# Patient Record
Sex: FEMALE | Race: WHITE
[De-identification: names, ages, dates, MRNs, and addresses within clinical notes are randomized per-mention and may not be internally consistent; named-entity substitution may affect disease eponyms.]

---

## 2021-10-08 ENCOUNTER — HOSPITAL ENCOUNTER (EMERGENCY)
Dept: HOSPITAL 76 - ED | Age: 31
LOS: 1 days | Discharge: HOME | End: 2021-10-09
Payer: MEDICAID

## 2021-10-08 DIAGNOSIS — D72.829: ICD-10-CM

## 2021-10-08 DIAGNOSIS — N83.201: Primary | ICD-10-CM

## 2021-10-08 PROCEDURE — 87086 URINE CULTURE/COLONY COUNT: CPT

## 2021-10-08 PROCEDURE — 96375 TX/PRO/DX INJ NEW DRUG ADDON: CPT

## 2021-10-08 PROCEDURE — 74177 CT ABD & PELVIS W/CONTRAST: CPT

## 2021-10-08 PROCEDURE — 99284 EMERGENCY DEPT VISIT MOD MDM: CPT

## 2021-10-08 PROCEDURE — 80053 COMPREHEN METABOLIC PANEL: CPT

## 2021-10-08 PROCEDURE — 96374 THER/PROPH/DIAG INJ IV PUSH: CPT

## 2021-10-08 PROCEDURE — 36415 COLL VENOUS BLD VENIPUNCTURE: CPT

## 2021-10-08 PROCEDURE — 99283 EMERGENCY DEPT VISIT LOW MDM: CPT

## 2021-10-08 PROCEDURE — 85025 COMPLETE CBC W/AUTO DIFF WBC: CPT

## 2021-10-08 PROCEDURE — 83690 ASSAY OF LIPASE: CPT

## 2021-10-08 PROCEDURE — 81003 URINALYSIS AUTO W/O SCOPE: CPT

## 2021-10-08 PROCEDURE — 84702 CHORIONIC GONADOTROPIN TEST: CPT

## 2021-10-08 PROCEDURE — 81001 URINALYSIS AUTO W/SCOPE: CPT

## 2021-10-08 NOTE — ED PHYSICIAN DOCUMENTATION
PD HPI ABD PAIN





- Stated complaint


Stated Complaint: LOW R SIDE PX





- Chief complaint


Chief Complaint: Abd Pain





- History obtained from


History obtained from: Patient





- History of Present Illness


Timing - onset: How many days ago ("few days" per patient)


Timing - details: Gradual onset, Waxing and waning


Pain level now: 5


Quality: Pain


Location: RLQ


Radiation: Lower back, Other (right hip)


Improved by: Laying still


Worsened by: Moving, Palpation


Associated symptoms: No: Fever, Nausea, Vomiting, Diarrhea, Constipation


Similar symptoms before: Has not had sx before


Recently seen: Not recently seen





Review of Systems


Constitutional: denies: Fever, Chills, Sweats


Cardiac: reports: Reviewed and negative


Respiratory: reports: Reviewed and negative


GI: reports: Abdominal Pain.  denies: Abdominal Swelling, Nausea, Vomiting, 

Constipation, Diarrhea


: denies: Dysuria, Frequency, Vaginal bleeding, Now pregnant EGA


Musculoskeletal: reports: Back pain





PD PAST MEDICAL HISTORY





- Past Medical History


Past Medical History: Yes


GYN: Endometriosis





- Past Surgical History


Past Surgical History: Yes


General: Cholecystectomy, Appendectomy





- Present Medications


Home Medications: 


                                Ambulatory Orders











 Medication  Instructions  Recorded  Confirmed


 


HYDROcod/ACETAM 5/325 [Norco 5/325] 1 - 2 tablet PO Q6H PRN #14 tablet 10/09/21 














- Allergies


Allergies/Adverse Reactions: 


                                    Allergies











Allergy/AdvReac Type Severity Reaction Status Date / Time


 


No Known Drug Allergies Allergy   Verified 10/08/21 23:48














- Living Situation


Living Arrangement: reports: At home





PD ED PE NORMAL





- Vitals


Vital signs reviewed: Yes





- General


General: Alert and oriented X 3, No acute distress, Well developed/nourished





- Cardiac


Cardiac: RRR, No murmur





- Respiratory


Respiratory: No respiratory distress, Clear bilaterally





- Abdomen


Abdomen: Soft, Non distended, Other (mild TTP right hemipelvis without guarding 

or rebound, no specific point tenderness)





- Back


Back: No CVA TTP





- Derm


Derm: Normal color, Warm and dry, No rash





Results





- Vitals


Vitals: 


                               Vital Signs - 24 hr











  10/08/21 10/09/21 10/09/21





  23:41 01:48 03:30


 


Temperature 36.7 C  37.3 C


 


Heart Rate 135 H  92


 


Respiratory 16 22 20





Rate   


 


Blood Pressure 189/105 H  124/91 H


 


O2 Saturation 98  














  10/09/21





  03:31


 


Temperature 37.3 C


 


Heart Rate 92


 


Respiratory 20





Rate 


 


Blood Pressure 124/91 H


 


O2 Saturation 99








                                     Oxygen











O2 Source                      Room air

















- Labs


Labs: 


                                Laboratory Tests











  10/09/21 10/09/21 10/09/21





  00:12 00:12 00:12


 


WBC  13.5 H  


 


RBC  4.60  


 


Hgb  11.5 L  


 


Hct  37.6  


 


MCV  81.7  


 


MCH  25.0 L  


 


MCHC  30.6 L  


 


RDW  15.1 H  


 


Plt Count  288  


 


MPV  10.0  


 


Neut # (Auto)  9.5 H  


 


Lymph # (Auto)  2.7  


 


Mono # (Auto)  0.9  


 


Eos # (Auto)  0.2  


 


Baso # (Auto)  0.1  


 


Absolute Nucleated RBC  0.00  


 


Nucleated RBC %  0.0  


 


Sodium   136 


 


Potassium   3.9 


 


Chloride   101 


 


Carbon Dioxide   23 


 


Anion Gap   12.0 


 


BUN   12 


 


Creatinine   0.6 


 


Estimated GFR (MDRD)   116 


 


Glucose   124 H 


 


Calcium   8.9 


 


Total Bilirubin   0.6 


 


AST   20 


 


ALT   18 


 


Alkaline Phosphatase   58 


 


Total Protein   7.6 


 


Albumin   4.0 


 


Globulin   3.6 


 


Albumin/Globulin Ratio   1.1 


 


Lipase   27 


 


HCG, Quant    < 0.60


 


Urine Color   


 


Urine Clarity   


 


Urine pH   


 


Ur Specific Gravity   


 


Urine Protein   


 


Urine Glucose (UA)   


 


Urine Ketones   


 


Urine Occult Blood   


 


Urine Nitrite   


 


Urine Bilirubin   


 


Urine Urobilinogen   


 


Ur Leukocyte Esterase   


 


Urine RBC   


 


Urine WBC   


 


Ur Squamous Epith Cells   


 


Urine Bacteria   


 


Ur Microscopic Review   


 


Urine Culture Comments   














  10/09/21





  01:25


 


WBC 


 


RBC 


 


Hgb 


 


Hct 


 


MCV 


 


MCH 


 


MCHC 


 


RDW 


 


Plt Count 


 


MPV 


 


Neut # (Auto) 


 


Lymph # (Auto) 


 


Mono # (Auto) 


 


Eos # (Auto) 


 


Baso # (Auto) 


 


Absolute Nucleated RBC 


 


Nucleated RBC % 


 


Sodium 


 


Potassium 


 


Chloride 


 


Carbon Dioxide 


 


Anion Gap 


 


BUN 


 


Creatinine 


 


Estimated GFR (MDRD) 


 


Glucose 


 


Calcium 


 


Total Bilirubin 


 


AST 


 


ALT 


 


Alkaline Phosphatase 


 


Total Protein 


 


Albumin 


 


Globulin 


 


Albumin/Globulin Ratio 


 


Lipase 


 


HCG, Quant 


 


Urine Color  YELLOW


 


Urine Clarity  CLEAR


 


Urine pH  6.0


 


Ur Specific Gravity  1.020


 


Urine Protein  NEGATIVE


 


Urine Glucose (UA)  NEGATIVE


 


Urine Ketones  NEGATIVE


 


Urine Occult Blood  TRACE-INTA


 


Urine Nitrite  NEGATIVE


 


Urine Bilirubin  NEGATIVE


 


Urine Urobilinogen  0.2 (NORMAL)


 


Ur Leukocyte Esterase  SMALL H


 


Urine RBC  0-5


 


Urine WBC  4-5


 


Ur Squamous Epith Cells  FEW Squamous


 


Urine Bacteria  Few


 


Ur Microscopic Review  INDICATED


 


Urine Culture Comments  INDICATED














- Rads (name of study)


  ** CT A/P


Radiology: Prelim report reviewed, See rad report





PD MEDICAL DECISION MAKING





- ED course


Complexity details: reviewed results, re-evaluated patient, considered 

differential, d/w patient


ED course: 





mild leukocytosis noted on blood tests, small (2.9 cm) right ovarian cyst on CT 

without other significant/concerning findings. Results reviewed with patient, 

she reports improvement with 2 mg IV morphine, given toradol and PO vicodin for 

residual pain with shorts course rx for vicodin and return precautions reviewed.





I am prescribing a short course of short-acting opioid pain medication for this 

patient. I have reviewed the patients  and no concerning findings were 

noted. I have discussed that the opioids are for short term therapy only, and 

will not be refilled from the ED.





Departure





- Departure


Disposition: 01 Home, Self Care


Clinical Impression: 


Ovarian cyst


Qualifiers:


 Laterality: right Qualified Code(s): N83.201 - Unspecified ovarian cyst, right 

side





Condition: Good


Instructions:  ED Cyst Ovarian


Follow-Up: 


Aurelio Lai DO [Provider Admit Priv/Credential] - 


Prescriptions: 


HYDROcod/ACETAM 5/325 [Norco 5/325] 1 - 2 tablet PO Q6H PRN #14 tablet


 PRN Reason: Pain


Comments: 


A prescription for hydrocodone/acetaminophen has been electronically submitted 

to [facility] in [place].


Follow up with your ob/gyn, call Monday to arrange for next available 

appointment.





I am prescribing a short course of narcotic pain medication for you. These are 

potentially dangerous and addictive medications that should be used carefully. 


 These medications may constipate you. Take an over-the-counter stool softener 

(docusate) twice daily with plenty of water while taking these medications. If 

you go 24 hours without a bowel movement, take over-the-counter miralax, per 

package instructions.


 Do not drink or drive while taking these medications. 


 If you received narcotic or sedating medications while in the emergency 

department, do not drive for 24 hours.


 Store this medication in a safe, secure place and out of reach of children. 


 It is a violation of federal law to give or sell this medication to another 

person or to use in a manner other than prescribed.


 The ED will not refill narcotic prescriptions, including prescriptions lost or 

stolen.


 To dispose of unwanted medications:


 1. [facility] [address] in 

[place] has a medication drop box. They accept prescription medications (in 

pill form) Monday through Friday 9:00 a.m. to 5:00 p.m. 


 2. The [facility] [employer] accepts prescription medications 

(in pill form only) for disposal year round. Call [phone] for more 

information. 


 3. Contact the [facility] for the next [facility] sponsored prescription 

drug collection event. [phone], [phone], or [phone] 

[phone];


Discharge Date/Time: 10/09/21 03:34

## 2021-10-09 VITALS — SYSTOLIC BLOOD PRESSURE: 124 MMHG | DIASTOLIC BLOOD PRESSURE: 91 MMHG

## 2021-10-09 LAB
ALBUMIN DIAFP-MCNC: 4 G/DL (ref 3.2–5.5)
ALBUMIN/GLOB SERPL: 1.1 {RATIO} (ref 1–2.2)
ALP SERPL-CCNC: 58 IU/L (ref 42–121)
ALT SERPL W P-5'-P-CCNC: 18 IU/L (ref 10–60)
ANION GAP SERPL CALCULATED.4IONS-SCNC: 12 MMOL/L (ref 6–13)
AST SERPL W P-5'-P-CCNC: 20 IU/L (ref 10–42)
BASOPHILS NFR BLD AUTO: 0.1 10^3/UL (ref 0–0.1)
BASOPHILS NFR BLD AUTO: 0.4 %
BILIRUB BLD-MCNC: 0.6 MG/DL (ref 0.2–1)
BUN SERPL-MCNC: 12 MG/DL (ref 6–20)
CALCIUM UR-MCNC: 8.9 MG/DL (ref 8.5–10.3)
CHLORIDE SERPL-SCNC: 101 MMOL/L (ref 101–111)
CLARITY UR REFRACT.AUTO: CLEAR
CO2 SERPL-SCNC: 23 MMOL/L (ref 21–32)
CREAT SERPLBLD-SCNC: 0.6 MG/DL (ref 0.4–1)
EOSINOPHIL # BLD AUTO: 0.2 10^3/UL (ref 0–0.7)
EOSINOPHIL NFR BLD AUTO: 1.6 %
ERYTHROCYTE [DISTWIDTH] IN BLOOD BY AUTOMATED COUNT: 15.1 % (ref 12–15)
GFRSERPLBLD MDRD-ARVRAT: 116 ML/MIN/{1.73_M2} (ref 89–?)
GLOBULIN SER-MCNC: 3.6 G/DL (ref 2.1–4.2)
GLUCOSE SERPL-MCNC: 124 MG/DL (ref 70–100)
GLUCOSE UR QL STRIP.AUTO: NEGATIVE MG/DL
HCT VFR BLD AUTO: 37.6 % (ref 37–47)
HGB UR QL STRIP: 11.5 G/DL (ref 12–16)
KETONES UR QL STRIP.AUTO: NEGATIVE MG/DL
LIPASE SERPL-CCNC: 27 U/L (ref 22–51)
LYMPHOCYTES # SPEC AUTO: 2.7 10^3/UL (ref 1.5–3.5)
LYMPHOCYTES NFR BLD AUTO: 20.2 %
MCH RBC QN AUTO: 25 PG (ref 27–31)
MCHC RBC AUTO-ENTMCNC: 30.6 G/DL (ref 32–36)
MCV RBC AUTO: 81.7 FL (ref 81–99)
MONOCYTES # BLD AUTO: 0.9 10^3/UL (ref 0–1)
MONOCYTES NFR BLD AUTO: 7 %
NEUTROPHILS # BLD AUTO: 9.5 10^3/UL (ref 1.5–6.6)
NEUTROPHILS # SNV AUTO: 13.5 X10^3/UL (ref 4.8–10.8)
NEUTROPHILS NFR BLD AUTO: 70.5 %
NITRITE UR QL STRIP.AUTO: NEGATIVE
NRBC # BLD AUTO: 0 /100WBC
NRBC # BLD AUTO: 0 X10^3/UL
PDW BLD AUTO: 10 FL (ref 7.9–10.8)
PH UR STRIP.AUTO: 6 PH (ref 5–7.5)
PLATELET # BLD: 288 10^3/UL (ref 130–450)
POTASSIUM SERPL-SCNC: 3.9 MMOL/L (ref 3.5–5)
PROT SPEC-MCNC: 7.6 G/DL (ref 6.7–8.2)
PROT UR STRIP.AUTO-MCNC: NEGATIVE MG/DL
RBC # UR STRIP.AUTO: (no result) /UL
RBC # URNS HPF: (no result) /HPF (ref 0–5)
RBC MAR: 4.6 10^6/UL (ref 4.2–5.4)
SODIUM SERPLBLD-SCNC: 136 MMOL/L (ref 135–145)
SP GR UR STRIP.AUTO: 1.02 (ref 1–1.03)
SQUAMOUS URNS QL MICRO: (no result)
UROBILINOGEN UR QL STRIP.AUTO: (no result) E.U./DL
UROBILINOGEN UR STRIP.AUTO-MCNC: NEGATIVE MG/DL
WBC # UR MANUAL: (no result) /HPF (ref 0–5)

## 2021-10-09 NOTE — CT REPORT
PROCEDURE:  Abdomen/Pelvis W

 

INDICATIONS:  RLQ pain

 

CONTRAST:  IV CONTRAST: Optiray 320 ml: 100 PO CONTRAST: *NO PO CONTRAST 

 

TECHNIQUE:  

After the administration of IV contrast, 5 mm thick sections acquired from the diaphragms to the symp
hysis.  5 mm thick coronal and sagittal reformats were acquired.  For radiation dose reduction, the f
ollowing was used:  automated exposure control, adjustment of mA and/or kV according to patient size.
  

 

COMPARISON:  None.

 

FINDINGS:  

Image quality:  Excellent.  

 

ABDOMEN:  

Lung bases:  Lung bases are clear.  Heart size is normal.  A small hiatal hernia is incidentally note
d.

 

Solid organs:  Liver and spleen are normal in size and enhancement.  Gallbladder has been removed  Bi
liary system is non dilated.  Pancreas enhances normally.  No adrenal nodules.  Kidneys demonstrate n
ormal size and enhancement, without hydronephrosis.  

 

Peritoneum and bowel:  In this patient with this given history, scrutiny is given to the appendix. At
 the expected location of the appendix, there is a thin series of hyperdense foci seen, as on series 
6 images 35 and 36.  This does not appear inflamed. No focal right lower quadrant inflammatory change
s are seen. 

 

No dilated loops of small bowel are seen. A moderate amount of stool is seen within the colon.

 

Nodes and vessels:  No retroperitoneal or mesenteric adenopathy by size criteria.  Aorta and inferior
 vena cava are normal in size.  

 

Miscellaneous:  No ventral hernias.  

 

 

PELVIS:  

Genitourinary:  Bladder wall thickness is normal.  The uterus demonstrates an unremarkable appearance
 for age. No adnexal masses are seen. A right ovarian cyst is seen that measures 2.9 cm.

 

Miscellaneous:  No inguinal hernias or adenopathy.  

 

Bones:  No suspicious bony lesions.  No vertebral body compression fractures.  

 

 

 

 

IMPRESSION:  A thin hyperdense structure is seen within the right lower quadrant which is felt most l
ikely related to a small appendix with appendicoliths within it. However, differential diagnosis incl
udes postoperative change from appendectomy. No focal right lower quadrant inflammatory changes are s
een.

 

There is a moderate amount of stool seen within the colon. Please correlate with clinical constipatio
n.  

 

A 2.9 cm right ovarian cyst is seen, which is likely within physiologic limits.

 

 

Incidental note is made of:

Small hiatal hernia

Cholecystectomy

 

 

Note: No significant discrepancy from the preliminary report.

 

 

 

Reviewed by: Jax Antoine MD on 10/9/2021 8:08 AM AKDT

Approved by: Jax Antoine MD on 10/9/2021 8:08 AM SHI

 

 

Station ID:  [id number]

## 2022-09-22 ENCOUNTER — HOSPITAL ENCOUNTER (OUTPATIENT)
Dept: HOSPITAL 76 - DI.S | Age: 32
Discharge: HOME | End: 2022-09-22
Attending: PHYSICIAN ASSISTANT
Payer: MEDICAID

## 2022-09-22 DIAGNOSIS — J06.9: ICD-10-CM

## 2022-09-22 DIAGNOSIS — R05.9: Primary | ICD-10-CM

## 2022-09-22 NOTE — XRAY REPORT
PROCEDURE:  Chest 2 View X-Ray

 

INDICATIONS:  UPPER RESPIRATORY INFECTION/COUGH

 

TECHNIQUE:  2 view(s) of the chest.  

 

COMPARISON:  None.

 

FINDINGS:  

 

Surgical changes and devices:  None

 

Lungs and pleura:  No pleural effusions or pneumothorax.  Lungs are clear.  

 

Mediastinum:  Mediastinal contours are normal.  Heart size is normal.  

 

Bones and chest wall:  No suspicious bony abnormalities.  Soft tissues appear unremarkable.  

 

IMPRESSION:  Normal two-view chest x-ray

 

Reviewed by: Meet Lee MD on 9/22/2022 5:18 PM AKCELINE

Approved by: Meet Lee MD on 9/22/2022 5:18 PM AKDT

 

 

Station ID:  [id number]